# Patient Record
Sex: MALE | Race: WHITE | NOT HISPANIC OR LATINO | URBAN - NONMETROPOLITAN AREA
[De-identification: names, ages, dates, MRNs, and addresses within clinical notes are randomized per-mention and may not be internally consistent; named-entity substitution may affect disease eponyms.]

---

## 2024-10-02 ENCOUNTER — OFFICE VISIT (OUTPATIENT)
Dept: CARDIOLOGY | Facility: CLINIC | Age: 44
End: 2024-10-02
Payer: OTHER GOVERNMENT

## 2024-10-02 VITALS
DIASTOLIC BLOOD PRESSURE: 70 MMHG | SYSTOLIC BLOOD PRESSURE: 132 MMHG | BODY MASS INDEX: 27.7 KG/M2 | HEART RATE: 73 BPM | HEIGHT: 69 IN | WEIGHT: 187 LBS | OXYGEN SATURATION: 96 %

## 2024-10-02 DIAGNOSIS — R53.83 FATIGUE, UNSPECIFIED TYPE: ICD-10-CM

## 2024-10-02 DIAGNOSIS — R94.31 ABNORMAL ECG: ICD-10-CM

## 2024-10-02 DIAGNOSIS — E78.5 HYPERLIPIDEMIA, UNSPECIFIED HYPERLIPIDEMIA TYPE: ICD-10-CM

## 2024-10-02 DIAGNOSIS — R06.09 DYSPNEA ON EXERTION: Primary | ICD-10-CM

## 2024-10-02 PROCEDURE — 99204 OFFICE O/P NEW MOD 45 MIN: CPT | Performed by: HOSPITALIST

## 2024-10-02 PROCEDURE — 93000 ELECTROCARDIOGRAM COMPLETE: CPT | Performed by: HOSPITALIST

## 2024-10-02 RX ORDER — CITALOPRAM HYDROBROMIDE 40 MG/1
TABLET ORAL
COMMUNITY
Start: 2024-06-18

## 2024-10-02 RX ORDER — TRAZODONE HYDROCHLORIDE 100 MG/1
100 TABLET ORAL NIGHTLY
COMMUNITY

## 2024-10-02 NOTE — PROGRESS NOTES
"Reason For Visit:   Fatigue, abnormal ECG, hyperlipidemia    Subjective        Ricki Forte is a 44 y.o. male with the below pertinent PMH who is referred for the above issues.    The patient was seen in primary care 9/6/2024 at which time he reported general fatigue/exhaustion.  He denied chest pain although mentioned occasionally feeling tightness chest.  He denies shortness of breath.  He was scheduled for a sleep study.  ECG was read as sinus rhythm with incomplete right bundle branch block and septal infarct; he was also referred to cardiology.    Labs 8/28/2024: Sodium 138, potassium 4.3, creatinine 1.3 AST 17, ALT 18, cholesterol 194, , HDL 29, triglyceride 171, WBC 5.0, hemoglobin 13.6, platelets 246 A1c 5.3    Today, the patient reports that for the last few months he has had somewhat constant fatigue and lack of energy even at rest.  That being said, he has particularly noticeable exertional fatigue as well as dyspnea on exertion.  He denies any chest pain, lightheadedness, or peripheral edema.  He gets occasional palpitations that he describes as a brief skipping of beats sometimes in the evening.    ROS: Pertinent findings are included above.      Pertinent PMH  Hyperlipidemia  Tobacco use  Family History: Maternal GF had heart attacks in his 50's, father had hemorrhagic stroke    Pertinent past medical, surgical, family, and social history were reviewed and updated in the EMR.      Current Outpatient Medications:     citalopram (CeleXA) 40 MG tablet, , Disp: , Rfl:     traZODone (DESYREL) 100 MG tablet, Take 1 tablet by mouth Every Night., Disp: , Rfl:      Objective   Vital Signs:  /70 (BP Location: Left arm, Patient Position: Sitting, Cuff Size: Adult)   Pulse 73   Ht 175.3 cm (69\")   Wt 84.8 kg (187 lb)   SpO2 96%   BMI 27.62 kg/m²   There is no height or weight on file to calculate BMI.      Constitutional:       Appearance: Healthy appearance. Not in distress.   Neck:      " Vascular: JVD normal.   Pulmonary:      Effort: Pulmonary effort is normal.      Breath sounds: Normal breath sounds.   Cardiovascular:      Normal rate. Regular rhythm.      Murmurs: There is no murmur.      No gallop.  No click. No rub.   Edema:     Peripheral edema absent.   Abdominal:      General: There is no distension.      Palpations: Abdomen is soft.      Tenderness: There is no abdominal tenderness.   Skin:     General: Skin is warm and dry.   Neurological:      Mental Status: Alert and oriented to person, place and time.       Result Review :             ECG 12 Lead    Date/Time: 10/2/2024 3:22 PM  Performed by: Freedom Chin MD    Authorized by: Freedom Chin MD  Previous ECG: no previous ECG available  Rhythm: sinus rhythm  Rate: normal  BPM: 73  Conduction comments: Possible RV conduction delay  QRS axis: normal  Clinical impression comment: Borderline ECG            Assessment and Plan   Diagnoses and all orders for this visit:    1. Dyspnea on exertion (Primary)  -     Adult Transthoracic Echo Complete w/ Color, Spectral and Contrast if necessary per protocol; Future  -     Adult Stress Echo W/ Cont or Stress Agent if Necessary Per Protocol; Future    2. Fatigue, unspecified type    3. Abnormal ECG    4. Hyperlipidemia, unspecified hyperlipidemia type    Other orders  -     ECG 12 Lead      Somewhat vague symptoms although he does have dyspnea on exertion that could be cardiac and risk factors for CAD.  We discussed options and agreed to proceed with a stress test and an echocardiogram.  His wife does mention that he snores, and I agree with sleep apnea testing that [facility].    [facility] with a right bundle branch block and inferior infarct; he has a possible RV conduction delay although does not meet criteria for RBBB on ECG today and does not meet criteria by ECG for inferior infarct.  This is overall reassuring but I do think he will  benefit from the echo as noted above.    Regarding lipids, he has mild LDL elevation although not to the degree that would warrant medical therapy at this time.  Continue dietary management for now.      Follow Up   Return in about 2 months (around 12/2/2024).  Patient was given instructions and counseling regarding his condition or for health maintenance advice. Please see specific information pulled into the AVS if appropriate.       EMR Dragon/Transcription disclaimer: Much of this encounter note is an electronic transcription/translation of spoken language to printed text. The electronic translation of spoken language may permit erroneous, or at times, nonsensical words or phrases to be inadvertently transcribed; although I have reviewed the note for such errors, some may still exist.

## 2024-10-09 ENCOUNTER — HOSPITAL ENCOUNTER (OUTPATIENT)
Dept: CARDIOLOGY | Facility: HOSPITAL | Age: 44
Discharge: HOME | End: 2024-10-09
Admitting: HOSPITALIST
Payer: OTHER GOVERNMENT

## 2024-10-09 VITALS
BODY MASS INDEX: 27.69 KG/M2 | HEIGHT: 69 IN | HEART RATE: 73 BPM | SYSTOLIC BLOOD PRESSURE: 117 MMHG | DIASTOLIC BLOOD PRESSURE: 75 MMHG | WEIGHT: 186.95 LBS

## 2024-10-09 DIAGNOSIS — R06.09 DYSPNEA ON EXERTION: ICD-10-CM

## 2024-10-09 LAB
BH CV ECHO MEAS - EDV(MOD-SP2): 77.9 ML
BH CV ECHO MEAS - EDV(MOD-SP4): 90.8 ML
BH CV ECHO MEAS - EF(MOD-SP2): 53.8 %
BH CV ECHO MEAS - EF(MOD-SP4): 55.5 %
BH CV ECHO MEAS - ESV(MOD-SP2): 35.9 ML
BH CV ECHO MEAS - ESV(MOD-SP4): 40.4 ML
BH CV ECHO MEAS - SV(MOD-SP2): 41.9 ML
BH CV ECHO MEAS - SV(MOD-SP4): 50.4 ML
BH CV STRESS BP STAGE 1: NORMAL
BH CV STRESS BP STAGE 2: NORMAL
BH CV STRESS DURATION MIN STAGE 1: 3
BH CV STRESS DURATION MIN STAGE 2: 3
BH CV STRESS DURATION SEC STAGE 1: 0
BH CV STRESS DURATION SEC STAGE 2: 0
BH CV STRESS GRADE STAGE 1: 10
BH CV STRESS GRADE STAGE 2: 12
BH CV STRESS HR STAGE 1: 126
BH CV STRESS HR STAGE 2: 153
BH CV STRESS METS STAGE 1: 5
BH CV STRESS METS STAGE 2: 7.5
BH CV STRESS PROTOCOL 1: NORMAL
BH CV STRESS RECOVERY BP: NORMAL MMHG
BH CV STRESS RECOVERY HR: 96 BPM
BH CV STRESS SPEED STAGE 1: 1.7
BH CV STRESS SPEED STAGE 2: 2.5
BH CV STRESS STAGE 1: 1
BH CV STRESS STAGE 2: 2
MAXIMAL PREDICTED HEART RATE: 178 BPM
PERCENT MAX PREDICTED HR: 85.96 %
STRESS BASELINE BP: NORMAL MMHG
STRESS BASELINE HR: 73 BPM
STRESS PERCENT HR: 101 %
STRESS POST ESTIMATED WORKLOAD: 7.5 METS
STRESS POST EXERCISE DUR MIN: 6 MIN
STRESS POST EXERCISE DUR SEC: 0 SEC
STRESS POST PEAK BP: NORMAL MMHG
STRESS POST PEAK HR: 153 BPM
STRESS TARGET HR: 151 BPM

## 2024-10-09 PROCEDURE — 93017 CV STRESS TEST TRACING ONLY: CPT

## 2024-10-09 PROCEDURE — 25510000001 PERFLUTREN 6.52 MG/ML SUSPENSION: Performed by: HOSPITALIST

## 2024-10-09 PROCEDURE — 93350 STRESS TTE ONLY: CPT

## 2024-10-09 RX ADMIN — PERFLUTREN 8.48 MG: 6.52 INJECTION, SUSPENSION INTRAVENOUS at 10:59

## 2024-10-18 ENCOUNTER — TELEPHONE (OUTPATIENT)
Dept: CARDIOLOGY | Facility: CLINIC | Age: 44
End: 2024-10-18
Payer: OTHER GOVERNMENT

## 2024-10-18 NOTE — TELEPHONE ENCOUNTER
----- Message from Freedom Chin sent at 10/9/2024  7:50 PM CDT -----  Please notify Mr. Forte of his overall reassuring stress echo without significant evidence of ischemia. Thank you!    Pt informed.  Kamron Olivear, CMA